# Patient Record
Sex: FEMALE | URBAN - METROPOLITAN AREA
[De-identification: names, ages, dates, MRNs, and addresses within clinical notes are randomized per-mention and may not be internally consistent; named-entity substitution may affect disease eponyms.]

---

## 2021-09-27 ENCOUNTER — WEB ENCOUNTER (OUTPATIENT)
Dept: URBAN - METROPOLITAN AREA CLINIC 35 | Facility: CLINIC | Age: 31
End: 2021-09-27

## 2021-09-27 ENCOUNTER — OFFICE VISIT (OUTPATIENT)
Dept: URBAN - METROPOLITAN AREA CLINIC 33 | Facility: CLINIC | Age: 31
End: 2021-09-27

## 2021-09-27 VITALS
OXYGEN SATURATION: 97 % | WEIGHT: 110 LBS | SYSTOLIC BLOOD PRESSURE: 108 MMHG | DIASTOLIC BLOOD PRESSURE: 76 MMHG | HEART RATE: 70 BPM | BODY MASS INDEX: 17.68 KG/M2 | HEIGHT: 66 IN

## 2021-09-27 PROBLEM — 267024001 ABNORMAL WEIGHT LOSS: Status: ACTIVE | Noted: 2021-09-27

## 2021-09-27 PROBLEM — 79922009 EPIGASTRIC PAIN: Status: ACTIVE | Noted: 2021-09-27

## 2021-09-27 PROBLEM — 3696007 FUNCTIONAL DYSPEPSIA: Status: ACTIVE | Noted: 2021-09-27

## 2021-09-27 RX ORDER — MULTIVIT-MIN/IRON/FOLIC ACID/K 18-600-40
AS DIRECTED CAPSULE ORAL
Status: ACTIVE | COMMUNITY

## 2021-09-27 RX ORDER — AMITRIPTYLINE HYDROCHLORIDE 10 MG/1
1 TABLET AT BEDTIME TABLET, FILM COATED ORAL ONCE A DAY
Qty: 30 | Refills: 2 | OUTPATIENT
Start: 2021-09-28

## 2021-09-27 RX ORDER — CHOLECALCIFEROL (VITAMIN D3) 50 MCG
1 TABLET TABLET ORAL ONCE A DAY
Qty: 30 | Status: ACTIVE | COMMUNITY

## 2021-09-27 NOTE — EXAM-MIGRATED EXAMINATIONS
General Examination : Medical assistant was in room.;     GENERAL APPEARANCE: - alert, in no acute distress, well developed, well nourished;   ORAL CAVITY: - mucosa moist;   NECK/THYROID: - neck supple, full range of motion;   HEART: - no murmurs, regular rate and rhythm, S1, S2 normal;   LUNGS: - clear to auscultation bilaterally, good air movement, no wheezes, rales, rhonchi;   ABDOMEN: - bowel sounds present, no masses palpable, no organomegaly , no rebound tenderness, soft, nontender, nondistended;

## 2021-09-27 NOTE — HPI-MIGRATED HPI
;     Chest pain :                                      30 year old patient states she has been experiencing symptoms for the past 6 months .   Patient admits currently taking  OTC [place] stomach supplement   for relief of symptoms. Patient describes symptoms as chest pains  and epigastric pain and states they are intermittent throughout the day . Patient admits  nighttime symptoms. Patient admits denies nausea or vomiting. Patient admits  associated weight loss. Patient admits EGD  and EUS in the past. Payient also admits having normal PH [place] Study that resulted normal . ;

## 2021-10-21 ENCOUNTER — DASHBOARD ENCOUNTERS (OUTPATIENT)
Age: 31
End: 2021-10-21

## 2021-10-29 ENCOUNTER — OFFICE VISIT (OUTPATIENT)
Dept: URBAN - METROPOLITAN AREA CLINIC 35 | Facility: CLINIC | Age: 31
End: 2021-10-29

## 2021-10-29 RX ORDER — MULTIVIT-MIN/IRON/FOLIC ACID/K 18-600-40
AS DIRECTED CAPSULE ORAL
Status: ACTIVE | COMMUNITY

## 2021-10-29 RX ORDER — AMITRIPTYLINE HYDROCHLORIDE 10 MG/1
1 TABLET AT BEDTIME TABLET, FILM COATED ORAL ONCE A DAY
Qty: 30 | Refills: 2 | Status: ACTIVE | COMMUNITY
Start: 2021-09-28

## 2021-10-29 RX ORDER — CHOLECALCIFEROL (VITAMIN D3) 50 MCG
1 TABLET TABLET ORAL ONCE A DAY
Qty: 30 | Status: ACTIVE | COMMUNITY

## 2021-10-29 NOTE — HPI-MIGRATED HPI
;     Chest pain : Patient present today for her 4 week follow-up. Patient admits/denies chest pains has subsided. She states the otc supplement is working. Patient admits/denies   Last Visit (09/27/2021) 30 year old patient states she has been experiencing symptoms for the past 6 months .   Patient admits currently taking  OTC [place] stomach supplement   for relief of symptoms. Patient describes symptoms as chest pains  and epigastric pain and states they are intermittent throughout the day . Patient admits  nighttime symptoms. Patient admits denies nausea or vomiting. Patient admits  associated weight loss. Patient admits EGD  and EUS in the past. Payient also admits having normal PH [place] Study that resulted normal .;